# Patient Record
Sex: FEMALE | Race: ASIAN | NOT HISPANIC OR LATINO | URBAN - METROPOLITAN AREA
[De-identification: names, ages, dates, MRNs, and addresses within clinical notes are randomized per-mention and may not be internally consistent; named-entity substitution may affect disease eponyms.]

---

## 2017-04-06 ENCOUNTER — EMERGENCY (EMERGENCY)
Facility: HOSPITAL | Age: 22
LOS: 1 days | Discharge: HOME | End: 2017-04-06
Attending: EMERGENCY MEDICINE | Admitting: EMERGENCY MEDICINE
Payer: COMMERCIAL

## 2017-04-06 VITALS
RESPIRATION RATE: 20 BRPM | HEART RATE: 116 BPM | SYSTOLIC BLOOD PRESSURE: 121 MMHG | DIASTOLIC BLOOD PRESSURE: 83 MMHG | HEIGHT: 59 IN | OXYGEN SATURATION: 100 % | TEMPERATURE: 98 F

## 2017-04-06 DIAGNOSIS — M25.562 PAIN IN LEFT KNEE: ICD-10-CM
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
## 2017-04-06 DIAGNOSIS — S40.212A ABRASION OF LEFT SHOULDER, INITIAL ENCOUNTER: ICD-10-CM

## 2017-04-06 PROCEDURE — 99283 EMERGENCY DEPT VISIT LOW MDM: CPT

## 2017-04-06 RX ORDER — IBUPROFEN 200 MG
600 TABLET ORAL ONCE
Qty: 0 | Refills: 0 | Status: COMPLETED | OUTPATIENT
Start: 2017-04-06 | End: 2017-04-06

## 2017-04-06 RX ADMIN — Medication 600 MILLIGRAM(S): at 11:30
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
## 2017-04-06 NOTE — ED PROVIDER NOTE - PLAN OF CARE
Follow up with ortho this week if symptoms do not resolve.  Referral list provided.  Rest, ice and elevate knee.  Ambulate as tolerated. Take Motrin 600mg every 8 hrs for pain with food. Worsening pain, swelling, weakness, numbness return to ER Follow up with ortho this week if symptoms do not resolve.  Referral list provided.  Rest, ice and elevate knee.  Ambulate as tolerated. Wear ace bandage for comfort. Take Motrin 600mg every 8 hrs for pain with food. Worsening pain, swelling, weakness, numbness return to ER
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
## 2017-08-03 ENCOUNTER — EMERGENCY (EMERGENCY)
Facility: HOSPITAL | Age: 22
LOS: 1 days | Discharge: HOME | End: 2017-08-03
Attending: EMERGENCY MEDICINE | Admitting: EMERGENCY MEDICINE
Payer: COMMERCIAL

## 2017-08-03 VITALS
OXYGEN SATURATION: 100 % | SYSTOLIC BLOOD PRESSURE: 113 MMHG | DIASTOLIC BLOOD PRESSURE: 71 MMHG | RESPIRATION RATE: 16 BRPM | TEMPERATURE: 98 F | HEART RATE: 62 BPM

## 2017-08-03 PROCEDURE — 99283 EMERGENCY DEPT VISIT LOW MDM: CPT | Mod: 25

## 2017-08-03 NOTE — ED ADULT TRIAGE NOTE - CHIEF COMPLAINT QUOTE
Pt. c/o redness and swelling to R eye started 1/2hr ago. Denies headache, dizziness or visual changes.

## 2017-08-04 NOTE — ED PROVIDER NOTE - MEDICAL DECISION MAKING DETAILS
L eye erythematous with irritation. 20/20 visual acuity, pupils equal and reactive b/l, no corneal abrasions on Wood's lamp, no foreign body

## 2017-08-04 NOTE — ED PROVIDER NOTE - PHYSICAL EXAMINATION
Attending pt no distress. nontoxic appearing. noted visual acuity. left conjunctiva injected. EOMI. PERRLA. no corneal abrasion noted. no FB noted.

## 2017-08-04 NOTE — ED PROVIDER NOTE - OBJECTIVE STATEMENT
The pt is a 21 yo woman who presents with 30 minutes of L eye erythema, swelling and irritation. Pt states that she was home reading when she noted her eye irritation. Pt reports the irritation "feels like there is something in my eye", with a 5/10 discomfort. Pt denies any eye trauma, changes in vision, blurriness, eye discharge, tearing and photophobia. Pt normally wears contacts but denies wearing contacts for the past 3 days. Denies sick contacts, F/C/N/V/HA. The pt is a 23 yo woman who presents with 30 minutes of L eye erythema, swelling and irritation. Pt states that she was home reading when she noted her eye irritation. Pt reports the irritation "feels like there is something in my eye", with a 5/10 discomfort. Pt denies any eye trauma, changes in vision, blurriness, eye discharge, tearing and photophobia. Pt normally wears contacts but denies wearing contacts for the past 3 days. Denies sick contacts, F/C/N/V/HA.  Attending  21yo F no sig pmhx pw 30min of redness of left eye while sitting in chair about to go on the computer. noted "white part of eye is red" no trauma. no discharge. hasn't used contact lens in 3 days. no change in vision. no blurred vision. no double vision. no tearing.

## 2017-08-04 NOTE — ED PROVIDER NOTE - CONDUCTED A DETAILED DISCUSSION WITH PATIENT AND/OR GUARDIAN REGARDING, MDM
return to ED if symptoms worsen, persist or questions arise/need for outpatient follow-up/list of opth given

## 2017-08-04 NOTE — ED PROVIDER NOTE - CHPI ED SYMPTOMS NEG
no discharge/no drainage/no purulent drainage/no foreign body/no double vision/no photophobia/no itching/no blurred vision

## 2018-10-10 NOTE — ED PROVIDER NOTE - CARE PLAN
Instructions for follow-up, activity and diet:	No contact lenses (2) probably inadequate Principal Discharge DX:	Conjunctival injection  Instructions for follow-up, activity and diet:	No contact lenses

## 2024-11-21 ENCOUNTER — EMERGENCY (EMERGENCY)
Facility: HOSPITAL | Age: 29
LOS: 1 days | Discharge: HOME | End: 2024-11-21
Attending: EMERGENCY MEDICINE | Admitting: EMERGENCY MEDICINE
Payer: SELF-PAY

## 2024-11-21 VITALS
HEART RATE: 85 BPM | TEMPERATURE: 98 F | RESPIRATION RATE: 26 BRPM | DIASTOLIC BLOOD PRESSURE: 82 MMHG | OXYGEN SATURATION: 100 % | HEIGHT: 59 IN | SYSTOLIC BLOOD PRESSURE: 124 MMHG | WEIGHT: 128.09 LBS

## 2024-11-21 PROCEDURE — 93010 ELECTROCARDIOGRAM REPORT: CPT

## 2024-11-21 PROCEDURE — 99284 EMERGENCY DEPT VISIT MOD MDM: CPT

## 2024-11-21 RX ORDER — ALBUTEROL 90 MCG
2 AEROSOL (GRAM) INHALATION
Qty: 56 | Refills: 3
Start: 2024-11-21 | End: 2024-12-18

## 2024-11-21 RX ORDER — DEXAMETHASONE 1.5 MG 1.5 MG/1
6 TABLET ORAL ONCE
Refills: 0 | Status: DISCONTINUED | OUTPATIENT
Start: 2024-11-21 | End: 2024-11-25

## 2024-11-21 RX ORDER — IPRATROPIUM BROMIDE AND ALBUTEROL SULFATE .5; 2.5 MG/3ML; MG/3ML
3 SOLUTION RESPIRATORY (INHALATION) ONCE
Refills: 0 | Status: DISCONTINUED | OUTPATIENT
Start: 2024-11-21 | End: 2024-11-25

## 2024-11-21 NOTE — ED PROVIDER NOTE - NSFOLLOWUPINSTRUCTIONS_ED_ALL_ED_FT
[facility] Internal [facility]  General Internal Medicine  [address] [place] [address]  Phone: [phone]  Fax:     [facility]  Internal Medicine  [address] [place] [address]  Phone: [phone]  Fax: [phone]    Asthma WHAT YOU NEED TO KNOW:    Asthma is a lung disease that makes breathing difficult. Chronic inflammation and reactions to triggers narrow the airways in the lungs. Asthma can become life-threatening if it is not managed.  Normal vs Asthmatic Bronchioles Adult    DISCHARGE INSTRUCTIONS:    Call your local emergency number (911 in the ) if:    You have severe shortness of breath.    The skin around your neck and ribs pulls in with each breath.    Your peak flow numbers are in the red zone of your AAP.  Seek care immediately if:    You have shortness of breath, even after you take your short-term medicine as directed.    Your lips or nails turn blue or gray.  Call your doctor or asthma specialist if:    You run out of medicine before your next refill is due.    Your symptoms get worse.    You need to take more medicine than usual to control your symptoms.    You have questions or concerns about your condition or care.  Medicines:    Medicines may be used to decrease inflammation, open airways, and make it easier to breathe. Medicines may be inhaled, taken as a pill, or injected. Short-term medicines relieve your symptoms quickly. Long-term medicines are used to prevent future asthma attacks. Other medicines may be needed if your regular medicines are not able to prevent attacks. You may also need medicine to help control your allergies. Ask your healthcare provider for more information about any medicine you are given and how to take it safely.    Take your medicine as directed. Contact your healthcare provider if you think your medicine is not helping or if you have side effects. Tell him or her if you are allergic to any medicine. Keep a list of the medicines, vitamins, and herbs you take. Include the amounts, and when and why you take them. Bring the list or the pill bottles to follow-up visits. Carry your medicine list with you in case of an emergency.  Manage your symptoms and prevent future attacks:    Follow your Asthma Action Plan (AAP). This is a written plan that you and your healthcare provider create. It explains which medicine you need and when to change doses if necessary. It also explains how you can monitor symptoms and use a peak flow meter. The meter measures how well your lungs are working.    Manage other health conditions, such as allergies, acid reflux, and sleep apnea.    Identify and avoid triggers. These may include pets, dust mites, mold, and cockroaches.    Do not smoke or be around others who smoke. Nicotine and other chemicals in cigarettes and cigars can cause lung damage. Ask your healthcare provider for information if you currently smoke and need help to quit. E-cigarettes or smokeless tobacco still contain nicotine. Talk to your healthcare provider before you use these products.    Ask about the flu vaccine. The flu can make your asthma worse. You may need a yearly flu shot.  Follow up with your healthcare provider as directed: You will need to return to make sure your medicine is working and your symptoms are controlled. You may be referred to an asthma or allergy specialist. You may be asked to keep a record of your peak flow values and bring it with you to your appointments. Write down your questions so you remember to ask them during your visits.

## 2024-11-21 NOTE — ED PROVIDER NOTE - CARE PLAN
Principal Discharge DX:	Asthma flare   1 [Routine Follow-Up] : a routine follow-up visit for [Asthma/RAD] : asthma/RAD [Patient] : patient [Mother] : mother [Medical Records] : medical records

## 2024-11-21 NOTE — ED PROVIDER NOTE - PATIENT PORTAL LINK FT
You can access the FollowMyHealth Patient Portal offered by [facility] by registering at the following website: [email or web address]. By joining [facility]’s FollowMyHealth portal, you will also be able to view your health information using other applications (apps) compatible with our system.

## 2024-11-21 NOTE — ED ADULT NURSE NOTE - OBJECTIVE STATEMENT
Patient is a 28 y/o female with a chief complaint of asthma exacerbation. Patient stated that she did not want medications, Dr. Estes aware and stated she could be registered and the patient stated she wanted to leave.

## 2024-11-21 NOTE — ED ADULT TRIAGE NOTE - CHIEF COMPLAINT QUOTE
pt states 1 hour ago, felt chest rightness/ sob asthma symptoms, gave 5 puffs of Albuterol with some relief, pt RR elevated, pt attempting to calm breathing. denies infections.

## 2024-11-21 NOTE — ED ADULT NURSE NOTE - NSFALLUNIVINTERV_ED_ALL_ED
Bed/Stretcher in lowest position, wheels locked, appropriate side rails in place/Call bell, personal items and telephone in reach/Instruct patient to call for assistance before getting out of bed/chair/stretcher/Non-slip footwear applied when patient is off stretcher/[place] to call system/Physically safe environment - no spills, clutter or unnecessary equipment/Purposeful proactive rounding/Room/bathroom lighting operational, light cord in reach

## 2024-11-21 NOTE — ED PROVIDER NOTE - CARE PROVIDER_API CALL
Marlyn Bell J  Pediatrics  [address] [place] [address]  Phone: [phone]  Fax: [phone]  Established Patient  Follow Up Time: 4-6 Days

## 2024-11-21 NOTE — ED PROVIDER NOTE - PROGRESS NOTE DETAILS
Tobias Villarreal MD PGY-1:: feeling improved, does not want neb or steroid here. Will send to pharmacy. Okay to DC, clear lungs, no wheezing Tobias Villarreal MD PGY-1:: patient left prior to being registered or receiving discharge paperwork. Prescriptions sent to pharmacy

## 2024-11-21 NOTE — ED PROVIDER NOTE - PHYSICAL EXAMINATION
GENERAL: Awake, alert, NAD  HEAD: NC/AT, neck supple, moist mucous membranes  EYES: PERRL, EOM grossly intact, sclera anicteric  LUNGS: normal WOB on RA, CTAB, no wheezes or crackles   CARDIAC: RRR, no m/r/g  ABDOMEN: Soft, non tender, non distended, no rebound, no guarding  EXT: no deformities.  NEURO: A&Ox3. Moving all extremities.  SKIN: Warm and dry. No rash.  PSYCH: Normal affect.

## 2024-11-21 NOTE — ED PROVIDER NOTE - CLINICAL SUMMARY MEDICAL DECISION MAKING FREE TEXT BOX
51-year-old female with history of asthma presenting with shortness of breath difficulty breathing.  Patient states this feels typical of prior asthma flares.  States cold weather typically causes Her to become wheezy and her asthma get worse.  She does not take a daily controller therapy but does have an albuterol as needed.  States she uses it approximately 6 times today,  last around 4 PM today.  Has never been intubated or hospitalized due to pain. No fevers, chills, cough, congestion, rhinorrhea, abdominal pain.     On exam appears well, nontoxic, no audible wheezing, not hypoxic or tachycardic. Lungs clear without wheezing on auscultation. Likely asthma flare triggered by cold weather, no prodromal infectious symptoms, low suspicion for pneumonia. Will give nebulizer and dexamethasone then reassesses.

## 2024-11-21 NOTE — ED PROVIDER NOTE - ATTENDING CONTRIBUTION TO CARE
51-year-old female with a past medical history of asthma w/ no h/o ETT/hospitalizations PTED c/o SOB Dypnea c/w prior asthma flares. At baseline patient only uses albuterol as needed but notes that cold weather worsens her symptoms. Today symptoms began in the afternoon and needed to use her MDI 6 times today. Symptoms are not assocated with  fevers, chills, cough, congestion, rhinorrhea, or abdominal pain.   VSS O2 sats normal   PE Nontoxic; lungs clear w/o wheezing    IMP: Cold induced Ashma;   Plan Symptomatic tx will give MDI/neb tx d/c with same + prednisone   RTED PRN

## 2024-11-22 PROBLEM — J45.909 UNSPECIFIED ASTHMA, UNCOMPLICATED: Chronic | Status: ACTIVE | Noted: 2017-04-06
